# Patient Record
Sex: FEMALE | Race: WHITE | NOT HISPANIC OR LATINO | URBAN - METROPOLITAN AREA
[De-identification: names, ages, dates, MRNs, and addresses within clinical notes are randomized per-mention and may not be internally consistent; named-entity substitution may affect disease eponyms.]

---

## 2022-01-21 ENCOUNTER — EMERGENCY (EMERGENCY)
Facility: HOSPITAL | Age: 32
LOS: 1 days | Discharge: HOME | End: 2022-01-21
Admitting: EMERGENCY MEDICINE
Payer: COMMERCIAL

## 2022-01-21 VITALS
RESPIRATION RATE: 18 BRPM | SYSTOLIC BLOOD PRESSURE: 132 MMHG | OXYGEN SATURATION: 96 % | WEIGHT: 138.89 LBS | HEIGHT: 63 IN | HEART RATE: 62 BPM | DIASTOLIC BLOOD PRESSURE: 71 MMHG | TEMPERATURE: 98 F

## 2022-01-21 DIAGNOSIS — M25.512 PAIN IN LEFT SHOULDER: ICD-10-CM

## 2022-01-21 DIAGNOSIS — R07.81 PLEURODYNIA: ICD-10-CM

## 2022-01-21 DIAGNOSIS — M25.562 PAIN IN LEFT KNEE: ICD-10-CM
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
## 2022-01-21 PROCEDURE — 99284 EMERGENCY DEPT VISIT MOD MDM: CPT | Mod: 25

## 2022-01-21 PROCEDURE — 71100 X-RAY EXAM RIBS UNI 2 VIEWS: CPT | Mod: 26,LT

## 2022-01-21 PROCEDURE — 71046 X-RAY EXAM CHEST 2 VIEWS: CPT | Mod: 26

## 2022-01-21 PROCEDURE — 71046 X-RAY EXAM CHEST 2 VIEWS: CPT

## 2022-01-21 PROCEDURE — 71100 X-RAY EXAM RIBS UNI 2 VIEWS: CPT

## 2022-01-21 NOTE — ED PROVIDER NOTE - NSFOLLOWUPINSTRUCTIONS_ED_ALL_ED_FT
Take tylenol 650mg or motrin 400-800mg as needed every 4-6 hours for pain.         Rib Contusion    WHAT YOU NEED TO KNOW:    A rib contusion is a bruise on one or more of your ribs.     Rib Cage         DISCHARGE INSTRUCTIONS:    Return to the emergency department if:   •You have increased chest pain.       •You have shortness of breath.       •You start to cough up blood.      •Your pain does not improve with pain medicine.      Contact your healthcare provider if:   •You have a cough.      •You have a fever.       •You have questions or concerns about your condition or care.       Medicines: You may need any of the following:   •NSAIDs, such as ibuprofen, help decrease swelling, pain, and fever. This medicine is available with or without a doctor's order. NSAIDs can cause stomach bleeding or kidney problems in certain people. If you take blood thinner medicine, always ask if NSAIDs are safe for you. Always read the medicine label and follow directions. Do not give these medicines to children under 6 months of age without direction from your child's healthcare provider.      •Prescription pain medicine may be given. Ask how to take this medicine safely.      •Take your medicine as directed. Contact your healthcare provider if you think your medicine is not helping or if you have side effects. Tell him of her if you are allergic to any medicine. Keep a list of the medicines, vitamins, and herbs you take. Include the amounts, and when and why you take them. Bring the list or the pill bottles to follow-up visits. Carry your medicine list with you in case of an emergency.      Deep breathing:   •To help prevent pneumonia, take 10 deep breaths every hour, even when you wake up during the night. Brace your ribs with your hands or a pillow while you take deep breaths or cough. This will help decrease your pain.      •You may need to use an incentive spirometer to help you take deeper breaths. Put the plastic piece into your mouth and take a very deep breath. Hold your breath as long as you can. Then let out your breath. Do this 10 times in a row every hour while you are awake.      Rest: Rest your ribs to decrease swelling and allow the injury to heal faster. Avoid activities that may cause more pain or damage to your ribs. As your pain decreases, begin movements slowly.    Ice: Ice helps decrease swelling and pain. Ice may also help prevent tissue damage. Use an ice pack or put crushed ice in a plastic bag. Cover it with a towel and place it on your bruised area for 15 to 20 minutes every hour as directed.    Follow up with your doctor as directed: Write down your questions so you remember to ask them during your visits

## 2022-01-21 NOTE — ED PROVIDER NOTE - CLINICAL SUMMARY MEDICAL DECISION MAKING FREE TEXT BOX
31 y/o F, no PMHx, coming in with L rib pain after mechanical trip and fall on Wednesday (2d ago)- no head trauma or loc.  no sob.  on exam VSS, pt well appearing, lungs ctab, hrrr, + ttp over L latearl ribs-- no skin changes or crepitus.  xray L rib/cxr shows no fx or ptx.  pt educated on supportive care.  discussed strict return parameters

## 2022-01-21 NOTE — ED PROVIDER NOTE - OBJECTIVE STATEMENT
33 y/o F, no PMHx, coming in with L rib pain after mechanical trip and fall on Wednesday (2d ago). Patient states she rolled her ankle, fell, and landed on L side, hitting her shoulder, knee, L ribs. No head trauma or LOC. Since then patient with pain to L lateral ribs, worse with deep breaths. Taking OTC meds w/ some improvement. Patient wants to ensure she did not break anything. Reporting mild pain to L shoulder, L knee, but able to walk and move both joints w/o issue. No fever, chills, cough, URI symptoms, chest pain, SOB, numbness, tingling, weakness, limited ROM of extremities. 33 y/o F, no PMHx, coming in with L rib pain after mechanical trip and fall on Wednesday (2d ago). Patient states she rolled her ankle, fell, and landed on L side, hitting her L shoulder, L knee, L ribs. No head trauma or LOC. Since then patient with pain to L lateral ribs, worse with deep breaths- no sob. Taking OTC meds w/ some improvement. Patient wants to ensure she did not break anything. Reporting mild pain to L shoulder, L knee, but able to walk and move both joints w/o issue. No fever, chills, cough, URI symptoms, chest pain, SOB, abdpain, numbness, tingling, weakness, limited ROM of extremities.

## 2022-01-21 NOTE — ED PROVIDER NOTE - PATIENT PORTAL LINK FT
You can access the FollowMyHealth Patient Portal offered by [facility] by registering at the following website: [email or web address]. By joining "[employer]"’s FollowMyHealth portal, you will also be able to view your health information using other applications (apps) compatible with our system.

## 2022-01-21 NOTE — ED PROVIDER NOTE - PHYSICAL EXAMINATION
Vitals reviewed  Gen: well appearing, nad, speaking in full sentences  Skin: wwp, no rash/lesions  HEENT: ncat, eomi, mmm  CV: + tenderness over L alteral ribs w/ no crepitus, deformity, ecchymosis. rrr, no audible m/r/g  Resp: symmetrical expansion, ctab, no w/r/r  Abd: nondistended, soft/nt  Ext: No joint tenderness, deformity. NVI. FROM throughout, no peripheral edema  Neuro: alert/oriented, no focal deficits, steady gait Vitals reviewed  Gen: well appearing, nad, speaking in full sentences  Skin: wwp, no rash/lesions  HEENT: ncat, eomi, mmm  CV: + tenderness over L lateral ribs w/ no crepitus, deformity, ecchymosis. rrr, no audible m/r/g  Resp: symmetrical expansion, ctab, no w/r/r  Abd: nondistended, soft/nt  Ext: No joint tenderness, deformity. NVI. FROM throughout, no peripheral edema  Neuro: alert/oriented, no focal deficits, steady gait

## 2022-01-21 NOTE — ED ADULT NURSE NOTE - NSIMPLEMENTINTERV_GEN_ALL_ED
Implemented All Universal Safety Interventions:  [place] to call system. Call bell, personal items and telephone within reach. Instruct patient to call for assistance. Room bathroom lighting operational. Non-slip footwear when patient is off stretcher. Physically safe environment: no spills, clutter or unnecessary equipment. Stretcher in lowest position, wheels locked, appropriate side rails in place.

## 2022-06-23 NOTE — ED ADULT NURSE NOTE - OBJECTIVE STATEMENT
pt presents a&ox3, no acute distress, pt had mechanical fall today. denies head trauma, LOC, n/v  c/o of pain to L shoulder, ribs, and knee
DISPLAY PLAN FREE TEXT